# Patient Record
Sex: MALE | Race: BLACK OR AFRICAN AMERICAN | Employment: OTHER | URBAN - METROPOLITAN AREA
[De-identification: names, ages, dates, MRNs, and addresses within clinical notes are randomized per-mention and may not be internally consistent; named-entity substitution may affect disease eponyms.]

---

## 2021-07-12 PROBLEM — Z99.3 WHEELCHAIR DEPENDENT: Status: ACTIVE | Noted: 2021-07-12

## 2021-07-12 PROBLEM — D23.5 DERMOID CYST OF SKIN OF BACK: Status: ACTIVE | Noted: 2021-07-12

## 2021-07-12 PROBLEM — I69.351 HEMIPARESIS AFFECTING RIGHT SIDE AS LATE EFFECT OF CEREBROVASCULAR ACCIDENT (HCC): Status: ACTIVE | Noted: 2021-07-12

## 2021-07-12 PROBLEM — Z86.73 HISTORY OF CVA (CEREBROVASCULAR ACCIDENT): Status: ACTIVE | Noted: 2021-07-12

## 2021-07-12 PROBLEM — Z79.02 LONG TERM CURRENT USE OF ANTITHROMBOTICS/ANTIPLATELETS: Status: ACTIVE | Noted: 2021-07-12

## 2021-07-15 RX ORDER — SODIUM CHLORIDE 0.9 % (FLUSH) 0.9 %
5-40 SYRINGE (ML) INJECTION EVERY 8 HOURS
Status: CANCELLED | OUTPATIENT
Start: 2021-07-15

## 2021-07-15 RX ORDER — SODIUM CHLORIDE 0.9 % (FLUSH) 0.9 %
5-40 SYRINGE (ML) INJECTION AS NEEDED
Status: CANCELLED | OUTPATIENT
Start: 2021-07-15

## 2021-07-26 RX ORDER — SODIUM CHLORIDE 0.9 % (FLUSH) 0.9 %
5-40 SYRINGE (ML) INJECTION AS NEEDED
Status: CANCELLED | OUTPATIENT
Start: 2021-07-26

## 2021-07-26 RX ORDER — SODIUM CHLORIDE 0.9 % (FLUSH) 0.9 %
5-40 SYRINGE (ML) INJECTION EVERY 8 HOURS
Status: CANCELLED | OUTPATIENT
Start: 2021-07-26

## 2021-07-26 NOTE — H&P
[address], [facility]  [phone]    H&P Note   Francis Bryan   MRN: [id number]     : 1945        HPI: Francis Bryan is a 77 y.o. 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 He is present with his son. Patient is in a wheelchair with right hemiparesis and aphasia status post left MCA stroke in 2021. He has been in a rehab facility since that time and is scheduled to go home on . He will have an aide at home. Son says that the cyst his father's back has been present since he was a child. This is clearly greater than 20 years and states that it will enlarge and father will squeeze it and drain it and then it will recur. It has never been attempted to be excised. It is unclear if there have been any formal attempts at I&D. Medical records are scarce but what could be reviewed or mainly associated with a trauma in  and the CVA in . He is on Plavix and has been taking it. He has a PEG in place but is now able to swallow without need for the PEG. He was placed on Bactrim for inflammation of the cyst.    Past Medical History:   Diagnosis Date    COVID-19 vaccine series completed 2021    Hypertension     Stroke [facility])     2021     Past Surgical History:   Procedure Laterality Date    HX HEENT      peg placement     No current facility-administered medications for this encounter. Current Outpatient Medications   Medication Sig    amLODIPine (NORVASC) 5 mg tablet 1 tab(s)    aspirin delayed-release 81 mg tablet Take 81 mg by mouth daily.  atorvastatin (LIPITOR) 80 mg tablet Take 80 mg by mouth At bedtime.  clopidogreL (PLAVIX) 75 mg tab Take 75 mg by mouth daily.  Last dose 21    hydroCHLOROthiazide (HYDRODIURIL) 25 mg tablet 1 tab(s)    omeprazole (PRILOSEC) 20 mg capsule 1 cap(s)    albuterol (Ventolin HFA) 90 mcg/actuation inhaler 2 puff(s)    potassium chloride in 0.9%NaCl 10 mEq/100 mL pgbk 2 tablets    scopolamine (TRANSDERM-SCOP) 1 mg over 3 days pt3d 1 Patch by TransDERmal route. ALLERGIES:  Patient has no known allergies. Social History     Socioeconomic History    Marital status: UNKNOWN     Spouse name: Not on file    Number of children: Not on file    Years of education: Not on file    Highest education level: Not on file   Tobacco Use    Smoking status: Never Smoker    Smokeless tobacco: Never Used   Substance and Sexual Activity    Alcohol use: Never     Social Determinants of Health     Financial Resource Strain:     Difficulty of Paying Living Expenses:    Food Insecurity:     Worried About Running Out of Food in the Last Year:     [address] in the Last Year:    Transportation Needs:     Lack of Transportation (Medical):  Lack of Transportation (Non-Medical):    Physical Activity:     Days of Exercise per Week:     Minutes of Exercise per Session:    Stress:     Feeling of Stress :    Social Connections:     Frequency of Communication with Friends and Family:     Frequency of Social Gatherings with Friends and Family:     Attends Rastafarian Services:     Active Member of Clubs or Organizations:     Attends Club or Organization Meetings:     Marital Status:      Social History     Tobacco Use   Smoking Status Never Smoker   Smokeless Tobacco Never Used     No family history on file. ROS: The patient has no difficulty with chest pain or shortness of breath. No fever or chills. The patient denies any personal or family history of abnormal clotting or bleeding. Comprehensive review of systems was otherwise unremarkable except as noted above. Physical Exam:   Constitutional: Alert oriented cooperative patient in no acute distress. In wheelchair, unable to speak. There were no vitals taken for this visit.   Visit Vitals  Pulse 80   Ht 6' 2\" (1.88 m)   Wt 160 lb (72.6 kg)   SpO2 97%   BMI 20.54 kg/m²     Eyes:Sclera are clear without icterus. ENMT: no obvious neck masses, no ear or lip lesions  CV: RRR. Normal perfusion  Resp: No JVD. Breathing is  non-labored. Back: Thin, 4 x 4 centimeter cystic mass consistent with epidermal inclusion cyst with resolving erythema and evidence of prior drainage site. There is no current induration or tenderness or drainage. GI: soft and non-distended with unremarkable PEG in place     Musculoskeletal: unremarkable with normal function. Neuro: Right hemiparesis with aphasia  Psychiatric: normal affect and mood, comprehends discussion    No results found for: WBC, WBCLT, HGB, HGBP, HCT, PHCT, RBCH, PLT, MCV, HGBEXT, HCTEXT, PLTEXT, HGBEXT, HCTEXT, PLTEXT    No results found for: NA, K, CL, CO2, BUN, CREA, GLU, INR, APTT, TBIL, TBILI, ALT, AP, AML, LPSE, INREXT, INREXT    Assessment/Plan:     Aj Zabala is a 77 y.o. male who presents with a longstanding epidermal cyst of the mid back adjacent to the spine. It is slightly more to the left of the spine been to her midline. This would likely be amenable with a near right side down lateral position or clearly in a prone position. This is not amenable to removal in the office given its size and his current use of Plavix. We will need to get the Plavix held and will schedule this for excision in the OR with anesthesia. Depending on position this can be done either as a general endotracheal, LMA or possibly even propofol anesthesia. We will discuss this more fully on the day of surgery. This will also determine positioning. He has aphasia and right hemiparesis secondary to left MCA CVA in February 2021. He is wheelchair dependent. We discussed proceeding with excision of epidermal cyst of the back. He also no longer requires access. Request is made to remove his PEG tube while under anesthesia. I discussed the patient's condition and treatment options with the patient.   I discussed risks of surgery in language the patient could understand including bleeding, infection, need for further surgery, abscess, fistula, SBO, DVT, PE, heart attack, stroke, renal failure, respiratory failure, ventilatory dependence, and death. The patient voiced understanding of all this and all questions were answered. Alternatives to surgery were discussed also and risks of the alternatives. The patient requested that we proceed with surgery. Informed consent was obtained.      Problem List  Date Reviewed: 7/12/2021        Codes Class Noted    Dermoid cyst of skin of back ICD-10-CM: D23.5  ICD-9-CM: 216.5  7/12/2021        History of CVA (cerebrovascular accident) ICD-10-CM: Z86.73  ICD-9-CM: V12.54  7/12/2021        Hemiparesis affecting right side as late effect of cerebrovascular accident [facility]) ICD-10-CM: L68.357  ICD-9-CM: 438.20  7/12/2021        Wheelchair dependent ICD-10-CM: Z99.3  ICD-9-CM: V46.3  7/12/2021        Long term current use of antithrombotics/antiplatelets SDD-09-HZ: Z79.02  ICD-9-CM: V58.63  7/12/2021              Ernestine Ortiz MD,  FACS

## 2021-07-27 ENCOUNTER — ANESTHESIA EVENT (OUTPATIENT)
Dept: SURGERY | Age: 76
End: 2021-07-27
Payer: OTHER GOVERNMENT

## 2021-07-27 ENCOUNTER — HOSPITAL ENCOUNTER (OUTPATIENT)
Age: 76
Setting detail: OUTPATIENT SURGERY
Discharge: HOME | End: 2021-07-27
Attending: SURGERY | Admitting: SURGERY
Payer: OTHER GOVERNMENT

## 2021-07-27 ENCOUNTER — ANESTHESIA (OUTPATIENT)
Dept: SURGERY | Age: 76
End: 2021-07-27
Payer: OTHER GOVERNMENT

## 2021-07-27 VITALS
TEMPERATURE: 97.5 F | BODY MASS INDEX: 18.61 KG/M2 | WEIGHT: 145 LBS | RESPIRATION RATE: 14 BRPM | HEART RATE: 71 BPM | SYSTOLIC BLOOD PRESSURE: 146 MMHG | OXYGEN SATURATION: 94 % | HEIGHT: 74 IN | DIASTOLIC BLOOD PRESSURE: 65 MMHG

## 2021-07-27 DIAGNOSIS — Z79.02 LONG TERM CURRENT USE OF ANTITHROMBOTICS/ANTIPLATELETS: ICD-10-CM

## 2021-07-27 DIAGNOSIS — M79.89 SOFT TISSUE MASS: ICD-10-CM

## 2021-07-27 LAB — POTASSIUM BLD-SCNC: 4.2 MMOL/L (ref 3.5–5.1)

## 2021-07-27 PROCEDURE — 2709999900 HC NON-CHARGEABLE SUPPLY: Performed by: SURGERY

## 2021-07-27 PROCEDURE — 74011250636 HC RX REV CODE- 250/636: Performed by: NURSE ANESTHETIST, CERTIFIED REGISTERED

## 2021-07-27 PROCEDURE — 74011000250 HC RX REV CODE- 250: Performed by: SURGERY

## 2021-07-27 PROCEDURE — 77030008462 HC STPLR SKN PROX J&J -A: Performed by: SURGERY

## 2021-07-27 PROCEDURE — 74011250636 HC RX REV CODE- 250/636: Performed by: SURGERY

## 2021-07-27 PROCEDURE — 76060000034 HC ANESTHESIA 1.5 TO 2 HR: Performed by: SURGERY

## 2021-07-27 PROCEDURE — 77030019908 HC STETH ESOPH SIMS -A: Performed by: ANESTHESIOLOGY

## 2021-07-27 PROCEDURE — 84132 ASSAY OF SERUM POTASSIUM: CPT

## 2021-07-27 PROCEDURE — 77030020269 HC MISC IMPL: Performed by: SURGERY

## 2021-07-27 PROCEDURE — 77030010507 HC ADH SKN DERMBND J&J -B: Performed by: SURGERY

## 2021-07-27 PROCEDURE — 76210000020 HC REC RM PH II FIRST 0.5 HR: Performed by: SURGERY

## 2021-07-27 PROCEDURE — 21933 EXC BACK TUM DEEP 5 CM/>: CPT | Performed by: SURGERY

## 2021-07-27 PROCEDURE — 88304 TISSUE EXAM BY PATHOLOGIST: CPT

## 2021-07-27 PROCEDURE — 77030040922 HC BLNKT HYPOTHRM STRY -A: Performed by: ANESTHESIOLOGY

## 2021-07-27 PROCEDURE — 74011000250 HC RX REV CODE- 250: Performed by: NURSE ANESTHETIST, CERTIFIED REGISTERED

## 2021-07-27 PROCEDURE — 76210000006 HC OR PH I REC 0.5 TO 1 HR: Performed by: SURGERY

## 2021-07-27 PROCEDURE — 76010000161 HC OR TIME 1 TO 1.5 HR INTENSV-TIER 1: Performed by: SURGERY

## 2021-07-27 PROCEDURE — 77030031139 HC SUT VCRL2 J&J -A: Performed by: SURGERY

## 2021-07-27 PROCEDURE — 77030040361 HC SLV COMPR DVT MDII -B: Performed by: SURGERY

## 2021-07-27 PROCEDURE — 77030010509 HC AIRWY LMA MSK TELE -A: Performed by: ANESTHESIOLOGY

## 2021-07-27 RX ORDER — SODIUM CHLORIDE 0.9 % (FLUSH) 0.9 %
5-40 SYRINGE (ML) INJECTION EVERY 8 HOURS
Status: DISCONTINUED | OUTPATIENT
Start: 2021-07-27 | End: 2021-07-27 | Stop reason: HOSPADM

## 2021-07-27 RX ORDER — CEFAZOLIN SODIUM/WATER 2 G/20 ML
SYRINGE (ML) INTRAVENOUS AS NEEDED
Status: DISCONTINUED | OUTPATIENT
Start: 2021-07-27 | End: 2021-07-27 | Stop reason: HOSPADM

## 2021-07-27 RX ORDER — CEFAZOLIN SODIUM/WATER 2 G/20 ML
2 SYRINGE (ML) INTRAVENOUS ONCE
Status: COMPLETED | OUTPATIENT
Start: 2021-07-27 | End: 2021-07-27

## 2021-07-27 RX ORDER — LIDOCAINE HYDROCHLORIDE 20 MG/ML
INJECTION, SOLUTION EPIDURAL; INFILTRATION; INTRACAUDAL; PERINEURAL AS NEEDED
Status: DISCONTINUED | OUTPATIENT
Start: 2021-07-27 | End: 2021-07-27 | Stop reason: HOSPADM

## 2021-07-27 RX ORDER — OXYCODONE AND ACETAMINOPHEN 10; 325 MG/1; MG/1
1 TABLET ORAL AS NEEDED
Status: DISCONTINUED | OUTPATIENT
Start: 2021-07-27 | End: 2021-07-27 | Stop reason: HOSPADM

## 2021-07-27 RX ORDER — METRONIDAZOLE 500 MG/100ML
500 INJECTION, SOLUTION INTRAVENOUS ONCE
Status: COMPLETED | OUTPATIENT
Start: 2021-07-27 | End: 2021-07-27

## 2021-07-27 RX ORDER — FENTANYL CITRATE 50 UG/ML
INJECTION, SOLUTION INTRAMUSCULAR; INTRAVENOUS AS NEEDED
Status: DISCONTINUED | OUTPATIENT
Start: 2021-07-27 | End: 2021-07-27 | Stop reason: HOSPADM

## 2021-07-27 RX ORDER — HYDROMORPHONE HYDROCHLORIDE 2 MG/ML
0.5 INJECTION, SOLUTION INTRAMUSCULAR; INTRAVENOUS; SUBCUTANEOUS
Status: DISCONTINUED | OUTPATIENT
Start: 2021-07-27 | End: 2021-07-27 | Stop reason: HOSPADM

## 2021-07-27 RX ORDER — SODIUM CHLORIDE 0.9 % (FLUSH) 0.9 %
5-40 SYRINGE (ML) INJECTION AS NEEDED
Status: DISCONTINUED | OUTPATIENT
Start: 2021-07-27 | End: 2021-07-27 | Stop reason: HOSPADM

## 2021-07-27 RX ORDER — BUPIVACAINE HYDROCHLORIDE AND EPINEPHRINE 5; 5 MG/ML; UG/ML
INJECTION, SOLUTION EPIDURAL; INTRACAUDAL; PERINEURAL AS NEEDED
Status: DISCONTINUED | OUTPATIENT
Start: 2021-07-27 | End: 2021-07-27 | Stop reason: HOSPADM

## 2021-07-27 RX ORDER — METRONIDAZOLE 500 MG/100ML
500 INJECTION, SOLUTION INTRAVENOUS ONCE
Status: DISCONTINUED | OUTPATIENT
Start: 2021-07-27 | End: 2021-07-27 | Stop reason: SDUPTHER

## 2021-07-27 RX ORDER — SODIUM CHLORIDE, SODIUM LACTATE, POTASSIUM CHLORIDE, CALCIUM CHLORIDE 600; 310; 30; 20 MG/100ML; MG/100ML; MG/100ML; MG/100ML
75 INJECTION, SOLUTION INTRAVENOUS CONTINUOUS
Status: DISCONTINUED | OUTPATIENT
Start: 2021-07-27 | End: 2021-07-27 | Stop reason: HOSPADM

## 2021-07-27 RX ORDER — DEXAMETHASONE SODIUM PHOSPHATE 4 MG/ML
INJECTION, SOLUTION INTRA-ARTICULAR; INTRALESIONAL; INTRAMUSCULAR; INTRAVENOUS; SOFT TISSUE AS NEEDED
Status: DISCONTINUED | OUTPATIENT
Start: 2021-07-27 | End: 2021-07-27 | Stop reason: HOSPADM

## 2021-07-27 RX ORDER — OXYCODONE HYDROCHLORIDE 5 MG/1
5 TABLET ORAL
Status: DISCONTINUED | OUTPATIENT
Start: 2021-07-27 | End: 2021-07-27 | Stop reason: HOSPADM

## 2021-07-27 RX ORDER — SODIUM CHLORIDE, SODIUM LACTATE, POTASSIUM CHLORIDE, CALCIUM CHLORIDE 600; 310; 30; 20 MG/100ML; MG/100ML; MG/100ML; MG/100ML
25 INJECTION, SOLUTION INTRAVENOUS CONTINUOUS
Status: DISCONTINUED | OUTPATIENT
Start: 2021-07-27 | End: 2021-07-27 | Stop reason: HOSPADM

## 2021-07-27 RX ORDER — CEFAZOLIN SODIUM/WATER 2 G/20 ML
2 SYRINGE (ML) INTRAVENOUS ONCE
Status: DISCONTINUED | OUTPATIENT
Start: 2021-07-27 | End: 2021-07-27 | Stop reason: SDUPTHER

## 2021-07-27 RX ORDER — ONDANSETRON 2 MG/ML
INJECTION INTRAMUSCULAR; INTRAVENOUS AS NEEDED
Status: DISCONTINUED | OUTPATIENT
Start: 2021-07-27 | End: 2021-07-27 | Stop reason: HOSPADM

## 2021-07-27 RX ORDER — PROPOFOL 10 MG/ML
INJECTION, EMULSION INTRAVENOUS AS NEEDED
Status: DISCONTINUED | OUTPATIENT
Start: 2021-07-27 | End: 2021-07-27 | Stop reason: HOSPADM

## 2021-07-27 RX ORDER — ACETAMINOPHEN 500 MG
1000 TABLET ORAL ONCE
Status: DISCONTINUED | OUTPATIENT
Start: 2021-07-27 | End: 2021-07-27 | Stop reason: HOSPADM

## 2021-07-27 RX ADMIN — LIDOCAINE HYDROCHLORIDE 100 MG: 20 INJECTION, SOLUTION EPIDURAL; INFILTRATION; INTRACAUDAL; PERINEURAL at 07:21

## 2021-07-27 RX ADMIN — CEFAZOLIN 2 G: 1 INJECTION, POWDER, FOR SOLUTION INTRAVENOUS at 07:29

## 2021-07-27 RX ADMIN — FENTANYL CITRATE 12.5 MCG: 50 INJECTION INTRAMUSCULAR; INTRAVENOUS at 07:47

## 2021-07-27 RX ADMIN — FENTANYL CITRATE 12.5 MCG: 50 INJECTION INTRAMUSCULAR; INTRAVENOUS at 07:38

## 2021-07-27 RX ADMIN — ONDANSETRON 4 MG: 2 INJECTION INTRAMUSCULAR; INTRAVENOUS at 07:32

## 2021-07-27 RX ADMIN — METRONIDAZOLE 500 MG: 500 INJECTION, SOLUTION INTRAVENOUS at 05:54

## 2021-07-27 RX ADMIN — CEFAZOLIN 2 G: 1 INJECTION, POWDER, FOR SOLUTION INTRAVENOUS at 07:26

## 2021-07-27 RX ADMIN — DEXAMETHASONE SODIUM PHOSPHATE 4 MG: 4 INJECTION, SOLUTION INTRAMUSCULAR; INTRAVENOUS at 07:32

## 2021-07-27 RX ADMIN — PROPOFOL 150 MG: 10 INJECTION, EMULSION INTRAVENOUS at 07:21

## 2021-07-27 RX ADMIN — FENTANYL CITRATE 25 MCG: 50 INJECTION INTRAMUSCULAR; INTRAVENOUS at 07:55

## 2021-07-27 RX ADMIN — SODIUM CHLORIDE, POTASSIUM CHLORIDE, SODIUM LACTATE AND CALCIUM CHLORIDE 25 ML/HR: 600; 310; 30; 20 INJECTION, SOLUTION INTRAVENOUS at 05:54

## 2021-07-27 NOTE — BRIEF OP NOTE
Brief Postoperative Note    Patient: Rosemary Keller  YOB: 1945  MRN: [id number]    Date of Procedure: 7/27/2021     Pre-Op Diagnosis: Mass on back [R22.2], PEG tube in place    Post-Op Diagnosis: Same as preoperative diagnosis. Procedure(s):  EXCISION CYSTIC SOFT TISSUE INTRAMUSCULAR MASS OF BACK (6 cm)  PERCUTANEOUS ENDOSCOPIC GASTROSTOMY TUBE REMOVAL    Surgeon(s):  Wilberto Peoples MD    Surgical Assistant: None    Anesthesia: General     Estimated Blood Loss (mL): less than 50     Complications: None    Specimens:   ID Type Source Tests Collected by Time Destination   1 : Cystic Mass of Back Preservative Back  Wilberto Peoples MD 7/27/2021 1246 Pathology        Implants:   Implant Name Type Inv. Item Serial No.  Lot No. LRB No. Used Action   Cellerate Rx Surgical Activated Collagen   [id number] W[id number]  N/A 1 Implanted       Drains: * No LDAs found *    Findings: PEG tube removed by traction technique without difficulty. Tube intact. Large cystic mass on mid upper back with sentinel pore and sites of prior drainage. Vertical oriented ellipse excised and wide elevation of flaps to uncover deep cystic mass which invaded trapezius muscle fascia. 6 x 5 x 3 cm mass. Hemostasis obtained. 1 gm Cellerate activated collagen powder placed in cavity. Layered closure of 2-0 vicryl and staples.     Electronically Signed by Petra Christianson MD on 7/27/2021 at 8:54 AM

## 2021-07-27 NOTE — ANESTHESIA PREPROCEDURE EVALUATION
Relevant Problems   No relevant active problems       Anesthetic History   No history of anesthetic complications            Review of Systems / Medical History  Patient summary reviewed and pertinent labs reviewed    Pulmonary  Within defined limits                 Neuro/Psych       CVA      Comments: R sided hemiparesis and aphasia Cardiovascular    Hypertension              Exercise tolerance: <4 METS     GI/Hepatic/Renal  Within defined limits             Comments: Peg in place Endo/Other  Within defined limits           Other Findings            Physical Exam    Airway  Mallampati: III  TM Distance: > 6 cm  Neck ROM: decreased range of motion   Mouth opening: Diminished (comment)     Cardiovascular    Rhythm: regular           Dental    Dentition: Full upper dentures     Pulmonary                 Abdominal  GI exam deferred       Other Findings            Anesthetic Plan    ASA: 3  Anesthesia type: general          Induction: Intravenous  Anesthetic plan and risks discussed with: Patient

## 2021-07-27 NOTE — DISCHARGE INSTRUCTIONS
Leave back dressing for 4 days, then remove. Leave staples intact until seen in follow up. May redress with bandage to protect as needed. Leave PEG site dressing intact for 4 days but change if leaks and is saturated/leaking. Dress as needed until fully closed. OK to shower tomorrow but do not spray water directly into wounds. Will need to re-dress PEG site. May resume normal activity as tolerated. Take Tylenol for pain as directed on label. No ibuprofen, Advil or Aleve. Resume Aspirin in 72 hours. Resume Plavix in 5 days. Follow-up with Dr Stepan Finn NP or PA in 2-3 weeks ([phone])     MEDICATION INTERACTION:  During your procedure you potentially received a medication or medications which may reduce the effectiveness of oral contraceptives. Please consider other forms of contraception for 1 month following your procedure if you are currently using oral contraceptives as your primary form of birth control. In addition to this, we recommend continuing your oral contraceptive as prescribed, unless otherwise instructed by your physician, during this time    After general anesthesia or intravenous sedation, for 24 hours or while taking prescription Narcotics:  · Limit your activities  · A responsible adult needs to be with you for the next 24 hours  · Do not drive and operate hazardous machinery  · Do not make important personal or business decisions  · Do not drink alcoholic beverages  · If you have not urinated within 8 hours after discharge, and you are experiencing discomfort from urinary retention, please go to the nearest ED. · If you have sleep apnea and have a CPAP machine, please use it for all naps and sleeping. · Please use caution when taking narcotics and any of your home medications that may cause drowsiness. *  Please give a list of your current medications to your Primary Care Provider.   *  Please update this list whenever your medications are discontinued, doses are      changed, or new medications (including over-the-counter products) are added. *  Please carry medication information at all times in case of emergency situations. These are general instructions for a healthy lifestyle:  No smoking/ No tobacco products/ Avoid exposure to second hand smoke  Surgeon General's Warning:  Quitting smoking now greatly reduces serious risk to your health. Obesity, smoking, and sedentary lifestyle greatly increases your risk for illness  A healthy diet, regular physical exercise & weight monitoring are important for maintaining a healthy lifestyle    You may be retaining fluid if you have a history of heart failure or if you experience any of the following symptoms:  Weight gain of 3 pounds or more overnight or 5 pounds in a week, increased swelling in our hands or feet or shortness of breath while lying flat in bed. Please call your doctor as soon as you notice any of these symptoms; do not wait until your next office visit.

## 2021-07-27 NOTE — ANESTHESIA POSTPROCEDURE EVALUATION
Procedure(s):  EXCISION MASS OF BACK RIGHT SIDE DOWN LATERAL  PERCUTANEOUS ENDOSCOPIC GASTROSTOMY TUBE REMOVAL. general    Anesthesia Post Evaluation      Multimodal analgesia: multimodal analgesia used between 6 hours prior to anesthesia start to PACU discharge  Patient location during evaluation: PACU  Patient participation: complete - patient participated  Level of consciousness: awake  Pain management: adequate  Airway patency: patent  Anesthetic complications: no  Cardiovascular status: acceptable  Respiratory status: acceptable  Hydration status: acceptable  Post anesthesia nausea and vomiting:  none  Final Post Anesthesia Temperature Assessment:  Normothermia (36.0-37.5 degrees C)      INITIAL Post-op Vital signs:   Vitals Value Taken Time   /65 07/27/21 0913   Temp 36.4 °C (97.5 °F) 07/27/21 0913   Pulse 73 07/27/21 0916   Resp 14 07/27/21 0913   SpO2 93 % 07/27/21 0916   Vitals shown include unvalidated device data.

## 2021-08-01 NOTE — OP NOTES
[address] [facility]  [phone]      [address] [place] [address] REPORT    Name: Otf Mcdonald     Date of Surgery: 7/27/2021    Med Record Number: [id number]   Age:76 y.o. Sex:male   Preoperative Diagnosis: Mass on back [R22.2], PEG tube in place  Postoperative Diagnosis: Same as preoperative diagnosis and intramuscular soft tissue mass. Procedure(s): EXCISION CYSTIC SOFT TISSUE INTRAMUSCULAR MASS OF BACK (6 cm) [cpt 92738]  PERCUTANEOUS ENDOSCOPIC GASTROSTOMY TUBE REMOVAL   Surgeon(s) and Role:  * Cristo Knott MD - Primary  Assistants: none  Anesthesia:  General  Complications: none apparent  Estimated Blood Loss (mL): less than 50 ml  Specimens:   ID Type Source Tests Collected by Time Destination   1 : Cystic Mass of Back Preservative Back  Yvon Angelo MD 7/27/2021 4055 Pathology      Implants:   Implant Name Type Inv. Item Serial No.  Lot No. LRB No. Used Action   Cellerate Rx Surgical Activated Collagen   [id number] WOUND CARE [employer]_  N/A 1 Implanted      Drains: * No LDAs found *     Findings: PEG tube removed by traction technique without difficulty. Tube intact. Large cystic mass on mid upper back with sentinel pore and sites of prior drainage. Vertical oriented ellipse excised and wide elevation of flaps to uncover deep cystic mass which invaded trapezius muscle fascia. 6 x 5 x 3 cm mass. Hemostasis obtained. 1 gm Cellerate activated collagen powder placed in cavity. Layered closure of 2-0 vicryl and staples. Procedure Description:   The risks, benefits, potential complications, treatment options, and expected outcomes were discussed with the patient pre-operatively. The patient voiced understanding and gave informed consent preoperatively. The patient was taken to the Operating Room, and the [address] [place] [address] time-out protocol checklist was followed.      After the induction of adequate anesthesia, while in the supine position, the PEG gastrostomy tube was uncovered and the bolster was moved distal on the tube. The tube was in good position and was functional without signs of complication, but was no longer needed. The stoma tract was lubricated with K-Y jelly and using a traction technique, the tube was removed and was intact. There was no bleeding at the stoma site. The site was then dressed with gauze and tape and the patient was repositioned to a right-side-down lateral position using a beanbag to expose the intended surgical area of the back. The back was prepped and draped in the usual sterile fashion. The lesion had been previously marked. Lesion appeared to be a large cyst that was known to be present for many years. A scar from prior drainage was visible as well as an area suspicious for a sentinel pore. The lesion was tethered to the overlying skin structures but also tethered deeply. An elliptical skin defect would be necessary to excise the lesion. Local anesthetic was infiltrated into the tissue surrounding the lesion and the vertically oriented ellipse was incised using a #15 scalpel. Patient was quite thin and this large lesion occupying the entirety of the soft tissue at this area. Flaps were created in both lateral directions to uncover the lesion with its fibrotic attachments to the surrounding tissue from years of inflammation. No active inflammation was present. No abscess was present. Following elevation of flaps in both directions the edge of the lesion was dissected using monopolar electrosurgical energy to excise the lesion which had clearly penetrated into the fascia of the trapezius muscle. This lesion, though cystic, was no longer a simple cyst but had involved the connective tissue of the underlying muscle as well as the entire depth of the subcutaneous soft tissue. Lesion was excised with the overlying skin paddle intact and measured 6 x 5 x 3 cm. This was sent to pathology for review. The depth of the excision required hemostasis using monopolar electrosurgical energy. There was considerable dead space requiring layered closure. To help eliminate the dead space, 1 g of Cellerate activated collagen powder was placed within the cavity. Layered closure was performed with deep sutures of interrupted 2-0 Vicryl and then the skin was closed using skin staples. A bordered dressing was applied. All sponge, instruments, and needle counts were correct. The patient was taken to PACU in good condition.     Jamie cSott MD, FACS

## 2021-08-16 PROBLEM — L72.0 EPIDERMAL INCLUSION CYST: Status: ACTIVE | Noted: 2021-08-16

## (undated) DEVICE — DERMABOND SKIN ADH 0.7ML -- DERMABOND ADVANCED 12/BX

## (undated) DEVICE — SUTURE VCRL SZ 3-0 L27IN ABSRB UD L26MM SH 1/2 CIR J416H

## (undated) DEVICE — Device

## (undated) DEVICE — SUTURE VCRL SZ 4-0 L27IN ABSRB UD L19MM PS-2 3/8 CIR PRIM J426H

## (undated) DEVICE — REM POLYHESIVE ADULT PATIENT RETURN ELECTRODE: Brand: VALLEYLAB

## (undated) DEVICE — JELLY LUBRICATING 10GM PREFIL SYR LUBE

## (undated) DEVICE — GARMENT,MEDLINE,DVT,INT,CALF,MED, GEN2: Brand: MEDLINE

## (undated) DEVICE — INTENDED FOR TISSUE SEPARATION, AND OTHER PROCEDURES THAT REQUIRE A SHARP SURGICAL BLADE TO PUNCTURE OR CUT.: Brand: BARD-PARKER ® STAINLESS STEEL BLADES

## (undated) DEVICE — PREP SKN CHLRAPRP APL 26ML STR --

## (undated) DEVICE — MINOR SPLIT GENERAL: Brand: MEDLINE INDUSTRIES, INC.

## (undated) DEVICE — ABSORBENT, WATERPROOF, BACTERIA PROOF FILM DRESSING: Brand: OPSITE POST OP 20X10CM CTN 20

## (undated) DEVICE — SUTURE VCRL SZ 2-0 L27IN ABSRB UD L26MM SH 1/2 CIR J417H

## (undated) DEVICE — NEEDLE HYPO 21GA L1.5IN INTRAMUSCULAR S STL LATCH BVL UP